# Patient Record
Sex: FEMALE | Race: WHITE | NOT HISPANIC OR LATINO
[De-identification: names, ages, dates, MRNs, and addresses within clinical notes are randomized per-mention and may not be internally consistent; named-entity substitution may affect disease eponyms.]

---

## 2018-11-13 ENCOUNTER — RECORD ABSTRACTING (OUTPATIENT)
Age: 73
End: 2018-11-13

## 2018-11-13 DIAGNOSIS — G89.29 OTHER CHRONIC PAIN: ICD-10-CM

## 2018-11-13 DIAGNOSIS — Z87.891 PERSONAL HISTORY OF NICOTINE DEPENDENCE: ICD-10-CM

## 2018-11-13 DIAGNOSIS — R27.8 OTHER LACK OF COORDINATION: ICD-10-CM

## 2018-11-13 DIAGNOSIS — Z86.19 PERSONAL HISTORY OF OTHER INFECTIOUS AND PARASITIC DISEASES: ICD-10-CM

## 2018-11-13 DIAGNOSIS — Z82.5 FAMILY HISTORY OF ASTHMA AND OTHER CHRONIC LOWER RESPIRATORY DISEASES: ICD-10-CM

## 2018-11-13 DIAGNOSIS — Z82.0 FAMILY HISTORY OF EPILEPSY AND OTHER DISEASES OF THE NERVOUS SYSTEM: ICD-10-CM

## 2018-11-13 DIAGNOSIS — E66.3 OVERWEIGHT: ICD-10-CM

## 2018-11-13 DIAGNOSIS — Z86.2 PERSONAL HISTORY OF DISEASES OF THE BLOOD AND BLOOD-FORMING ORGANS AND CERTAIN DISORDERS INVOLVING THE IMMUNE MECHANISM: ICD-10-CM

## 2018-11-13 LAB — CYTOLOGY CVX/VAG DOC THIN PREP: NORMAL

## 2018-12-04 ENCOUNTER — APPOINTMENT (OUTPATIENT)
Dept: OBGYN | Facility: CLINIC | Age: 73
End: 2018-12-04

## 2019-04-02 ENCOUNTER — APPOINTMENT (OUTPATIENT)
Dept: NEUROLOGY | Facility: CLINIC | Age: 74
End: 2019-04-02
Payer: MEDICARE

## 2019-04-02 VITALS
HEIGHT: 63 IN | SYSTOLIC BLOOD PRESSURE: 109 MMHG | WEIGHT: 175 LBS | TEMPERATURE: 99.5 F | HEART RATE: 78 BPM | BODY MASS INDEX: 31.01 KG/M2 | DIASTOLIC BLOOD PRESSURE: 75 MMHG

## 2019-04-02 PROCEDURE — 99205 OFFICE O/P NEW HI 60 MIN: CPT

## 2019-04-03 NOTE — CONSULT LETTER
[Dear  ___] : Dear  [unfilled], [FreeTextEntry1] : I had the pleasure of evaluating your patient ANDREAS CONTRERAS. Please see the assessment section for a summary of my diagnostic impression and plan.\par \par Thank you very much for allowing me to participate in the care of this patient. If you have any questions, please do not hesitate to contact me.\par \par Sincerely,\par \par Shereen Gómez MD\par

## 2019-04-03 NOTE — PHYSICAL EXAM
[___ / 30] : the patient achieved a total score of [unfilled] /30 [FreeTextEntry1] : Physical examination \par General: No acute distress, Awake, Alert.   \par Fundus: disc margins sharp.   \par Neck: no Carotid bruit.   \par Cardiovascular: Normal rate, Regular rhythm, No murmur.  \par \par Mental status \par Awake, alert, and oriented to person, time and place, Normal attention span and concentration, Recent and remote memory intact, Language intact, Fund of knowledge intact.   \par \par Cranial Nerves \par II: VFF  \par III, IV, VI: PERRL, EOMI.   Pupils 3--> 2, bilaterally.\par V: Facial sensation is normal B/L.   \par VII: Facial strength is normal B/L. \par VIII:   Decreased hearing bilaterally, worse on the right.\par IX, X: Palate is midline and elevates symmetrically.   \par XI: Trapezius normal strength.   \par XII: Tongue midline without atrophy or fasciculations. \par \par Motor exam  \par Muscle tone - no evidence of rigidity or resistance in all 4 extremities.  \par No atrophy or fasciculations \par Muscle Strength: arms and legs, proximal and distal flexors and extensors are normal \par No UE drift.\par \par Reflexes \par 1+ throughout\par \par Plantars right: mute.   \par Plantars left: mute.   \par \par \par Coordination \par Finger to nose: Normal.  \par Heel to shin: Normal.   \par \par \par Sensory \par Intact sensation to vibration and cold.\par \par \par \par Gait \par Normal including heels, toes, and tandem gait.  \par \par \par

## 2019-04-03 NOTE — REVIEW OF SYSTEMS
[Chest Pain] : chest pain [Negative] : Heme/Lymph [FreeTextEntry5] : chest pressure, leg swelling  [de-identified] : heat intolerance, cold intolerance

## 2019-04-03 NOTE — ASSESSMENT
[FreeTextEntry1] : Ms. Brice is a 75-year-old woman with multiple issues:\par Mild cognitive impairment possibly related to multiple concussions.  I referred the patient to Dr. Ky Haji, director of the [facility] program.\par Dizziness and imbalance with normal neurological examination.  She already has an appointment for ENT.  I recommend VNG, vestibular rehabilitation.\par

## 2019-11-21 ENCOUNTER — APPOINTMENT (OUTPATIENT)
Dept: GERIATRICS | Facility: CLINIC | Age: 74
End: 2019-11-21
Payer: MEDICARE

## 2019-11-21 VITALS
TEMPERATURE: 98.3 F | SYSTOLIC BLOOD PRESSURE: 120 MMHG | HEART RATE: 85 BPM | WEIGHT: 186 LBS | DIASTOLIC BLOOD PRESSURE: 60 MMHG | OXYGEN SATURATION: 97 % | BODY MASS INDEX: 32.95 KG/M2

## 2019-11-21 DIAGNOSIS — N60.19 DIFFUSE CYSTIC MASTOPATHY OF UNSPECIFIED BREAST: ICD-10-CM

## 2019-11-21 DIAGNOSIS — Z00.00 ENCOUNTER FOR GENERAL ADULT MEDICAL EXAMINATION W/OUT ABNORMAL FINDINGS: ICD-10-CM

## 2019-11-21 PROCEDURE — 93010 ELECTROCARDIOGRAM REPORT: CPT

## 2019-11-21 PROCEDURE — 99205 OFFICE O/P NEW HI 60 MIN: CPT | Mod: 25

## 2019-11-22 ENCOUNTER — NON-APPOINTMENT (OUTPATIENT)
Age: 74
End: 2019-11-22

## 2019-11-22 RX ORDER — MULTIVITAMIN
TABLET ORAL DAILY
Qty: 30 | Refills: 0 | Status: ACTIVE | COMMUNITY
Start: 2019-11-22

## 2019-11-22 RX ORDER — MULTIVITAMIN/IRON/FOLIC ACID 18MG-0.4MG
600-400 TABLET ORAL
Refills: 0 | Status: ACTIVE | COMMUNITY
Start: 2019-11-22

## 2019-11-22 NOTE — HISTORY OF PRESENT ILLNESS
[FreeTextEntry1] : Establish care, GYN referral, mammo script [de-identified] : 74 year old female with a history of FBD, numerous concussions / head injuries, HLD, HTN, DM, osteoporosis who presents today to establish care.  \par \par Patient with numerous head injuries / potential concussions throughout her life.  Now with significant memory loss, difficulty obtaining medical history.  \par \par DM: Patient currently on metformin 500 mg BID.  She does not follow a low carb, low sugar diet.  She does not check her BG.  LAst HgbA1c about one year ago, may have been 6.7%.  She does not routinely exercise due to chronic back pain.  Last eye exam one year ago, not sure of DRRonnie  Denies neuropathy.  \par \par HTN: Not on antihypertensives.  History of CP in the past, no recent episodes.  Consulted with cardiology 2017. She is complaining of palpitations intermittently.  She had a TTE 11/1/2017 which demonstrated NLVSF, EF 64%, grade I diastolic dysfunction, mild MV and AV regurg, AV sclerosis without stenosis.  NST 10/30/2017 normal study.  \par \par HLD: Long history of elevated lipids.  Started on atorvastatin in 2012 but stopped due to muscle cramps.  She was switched to pravastatin which she seems to tolerate however she is not compliant with taking her medications secondary to memory loss.   \par \par Ankle pain and swelling: Patient states she has a long standing history of swelling and pain to bilateral ankles.  Consulted for surgery but states no one wants to perform the procedure.  She is not sure of what is going on but states that the pain is so severe she has trouble walking. \par \par Patient is requesting an Rx for repeat mammo and referral for GYN.  She states she has a history of abnormal pap, had surgery but not sure of details.

## 2019-11-22 NOTE — HEALTH RISK ASSESSMENT
[6-10] : 6-10 [No] : In the past 12 months have you used drugs other than those required for medical reasons? No [Any fall with injury in past year] : Patient reported fall with injury in the past year [1] : 2) Feeling down, depressed, or hopeless for several days (1) [Change in mental status noted] : Change in mental status noted [Alone] : lives alone [Retired] : retired [] :  [# Of Children ___] : has [unfilled] children [Feels Safe at Home] : Feels safe at home [Fully functional (bathing, dressing, toileting, transferring, walking, feeding)] : Fully functional (bathing, dressing, toileting, transferring, walking, feeding) [Fully functional (using the telephone, shopping, preparing meals, housekeeping, doing laundry, using] : Fully functional and needs no help or supervision to perform IADLs (using the telephone, shopping, preparing meals, housekeeping, doing laundry, using transportation, managing medications and managing finances) [Reports changes in dental health] : Reports changes in dental health [Smoke Detector] : smoke detector [Carbon Monoxide Detector] : carbon monoxide detector [Seat Belt] :  uses seat belt [] : No [[id number]] : 2 [Sexually Active] : not sexually active [Reports changes in hearing] : Reports no changes in hearing [Reports changes in vision] : Reports no changes in vision [MammogramDate] : 1/15/2019 [MammogramComments] : BIRADS 2 [BoneDensityDate] : 1/12/2017 [BoneDensityComments] : Osteoporosis hip and spine [de-identified] : Memory loss [FreeTextEntry2] :  at [employer] for 20 years, retired 2008 [de-identified] : Peridental disease

## 2019-11-22 NOTE — ASSESSMENT
[FreeTextEntry1] : HLD: Need to repeat FLP.  Discussed using a pill box and setting an alarm on her phone to remember to take her medication.  Advised a low fat diet and increase physical activity as tolerated.  Continue with pravastatin 10 mg daily.  \par \par HTN: Not on medications, BP normal.  Advised a low salt diet, continue to monitor. \par \par Hyperparathyroidism: Check PTH.  Patient on vincent/vit D supplementation. \par \par Osteoporosis: Unable to use bisphosphonate due to GERD.  Not candidate for Prolia given periodontal disease.  Check vitamin D. \par \par Palpitations:  EKG SR.  If continues to experience, refer to cardiology. \par \par DM: Discussed importance of a low carb / sugar diet, increasing physical activity as tolerated and need to weight loss.  Continue with metformin 500 mg BID.  Check HgbA1c, Ma/Cr.\par \par Ankle pain: No edema but there is some soft tissue swelling.  Will obtain records to see if any imaging has been performed. \par \par History of concussion / memory loss: Patient referred to Dr. Haji in April, never completed.  Contact info once again provided.  \par \par HCM:\par Need to obtain medical records from previous PCP.  Some records were obtained from [facility] however do not know if these are the latest dates.  \par Pneumovax 12/16/2010 (age 65)\par Need Immunization record from PCP. \par Referral for mammo/US provided\par GYN referral provided, history of abnormal pap\par Colonoscopy: 12/12/2016 internal hemorrhoids\par \par Follow up in 3 months. 
No

## 2019-11-22 NOTE — REVIEW OF SYSTEMS
[Palpitations] : palpitations [Lower Ext Edema] : lower extremity edema [Joint Pain] : joint pain [Back Pain] : back pain [Memory Loss] : memory loss [Negative] : Heme/Lymph [de-identified] : Sleeps only 2-4 hours due to pain

## 2019-11-22 NOTE — PHYSICAL EXAM
[No Acute Distress] : no acute distress [Well-Appearing] : well-appearing [Normal Sclera/Conjunctiva] : normal sclera/conjunctiva [PERRL] : pupils equal round and reactive to light [EOMI] : extraocular movements intact [Normal Outer Ear/Nose] : the outer ears and nose were normal in appearance [Normal Oropharynx] : the oropharynx was normal [Normal TMs] : both tympanic membranes were normal [No JVD] : no jugular venous distention [No Lymphadenopathy] : no lymphadenopathy [Supple] : supple [Thyroid Normal, No Nodules] : the thyroid was normal and there were no nodules present [No Respiratory Distress] : no respiratory distress  [No Accessory Muscle Use] : no accessory muscle use [Clear to Auscultation] : lungs were clear to auscultation bilaterally [Normal Rate] : normal rate  [Regular Rhythm] : with a regular rhythm [Normal S1, S2] : normal S1 and S2 [No Murmur] : no murmur heard [No Carotid Bruits] : no carotid bruits [No Abdominal Bruit] : a ~M bruit was not heard ~T in the abdomen [No Varicosities] : no varicosities [Pedal Pulses Present] : the pedal pulses are present [No Edema] : there was no peripheral edema [No Palpable Aorta] : no palpable aorta [No Extremity Clubbing/Cyanosis] : no extremity clubbing/cyanosis [Soft] : abdomen soft [Non Tender] : non-tender [Non-distended] : non-distended [No HSM] : no HSM [Normal Bowel Sounds] : normal bowel sounds [Normal Supraclavicular Nodes] : no supraclavicular lymphadenopathy [Normal Posterior Cervical Nodes] : no posterior cervical lymphadenopathy [Normal Anterior Cervical Nodes] : no anterior cervical lymphadenopathy [No CVA Tenderness] : no CVA  tenderness [No Spinal Tenderness] : no spinal tenderness [No Joint Swelling] : no joint swelling [Grossly Normal Strength/Tone] : grossly normal strength/tone [No Rash] : no rash [Coordination Grossly Intact] : coordination grossly intact [No Focal Deficits] : no focal deficits [Normal Gait] : normal gait [Speech Grossly Normal] : speech grossly normal [Normal Affect] : the affect was normal [Normal Mood] : the mood was normal [Normal Appearance] : normal in appearance [No Masses] : no palpable masses [No Nipple Discharge] : no nipple discharge [de-identified] : Soft tissue mass bilaterally below the ankle [de-identified] : Romberg negative [de-identified] : Memory impairment

## 2020-01-09 ENCOUNTER — MEDICATION RENEWAL (OUTPATIENT)
Age: 75
End: 2020-01-09

## 2020-02-04 ENCOUNTER — RESULT REVIEW (OUTPATIENT)
Age: 75
End: 2020-02-04

## 2020-02-13 ENCOUNTER — APPOINTMENT (OUTPATIENT)
Dept: GERIATRICS | Facility: CLINIC | Age: 75
End: 2020-02-13

## 2020-02-26 ENCOUNTER — RESULT REVIEW (OUTPATIENT)
Age: 75
End: 2020-02-26

## 2020-02-26 ENCOUNTER — APPOINTMENT (OUTPATIENT)
Dept: GERIATRICS | Facility: CLINIC | Age: 75
End: 2020-02-26
Payer: MEDICARE

## 2020-02-26 VITALS
OXYGEN SATURATION: 96 % | SYSTOLIC BLOOD PRESSURE: 122 MMHG | TEMPERATURE: 98.5 F | BODY MASS INDEX: 32.77 KG/M2 | HEART RATE: 86 BPM | DIASTOLIC BLOOD PRESSURE: 60 MMHG | WEIGHT: 185 LBS

## 2020-02-26 DIAGNOSIS — M25.572 PAIN IN RIGHT ANKLE AND JOINTS OF RIGHT FOOT: ICD-10-CM

## 2020-02-26 DIAGNOSIS — G89.29 PAIN IN RIGHT ANKLE AND JOINTS OF RIGHT FOOT: ICD-10-CM

## 2020-02-26 DIAGNOSIS — Z87.820 PERSONAL HISTORY OF TRAUMATIC BRAIN INJURY: ICD-10-CM

## 2020-02-26 DIAGNOSIS — M25.571 PAIN IN RIGHT ANKLE AND JOINTS OF RIGHT FOOT: ICD-10-CM

## 2020-02-26 PROCEDURE — 99214 OFFICE O/P EST MOD 30 MIN: CPT

## 2020-02-26 NOTE — HISTORY OF PRESENT ILLNESS
[de-identified] : 74 year old female with a history of FBD, numerous concussions / head injuries, HLD, HTN, DM, osteoporosis who presents today for a follow up. \par \par DM: Patient currently on metformin 500 mg BID. Does not take consistent as she has issues with her memory.  She does not follow a low carb, low sugar diet.  She does not check her BG.  Last HgbA1c 5.9%.  She does not routinely exercise due to chronic back pain.  Last eye exam one year ago, not sure of DRRonnie  Denies neuropathy.  \par \par HLD: Long history of elevated lipids.  Started on atorvastatin in 2012 but stopped due to muscle cramps.  She was switched to pravastatin which she seems to tolerate however she is not compliant with taking her medications secondary to memory loss.   Last LP 2/2020 Tchol 221 ; HDL 69 ; TG 90 ; . \par \par Back pain / leg cramping: Continues to complain of back pain and having pain / cramping down her legs.  Worse with walking.  She denies any weakness but states that pain is so severe she has to "drag" her legs.  Last few months complaining of increased urinary frequency with urge incontinence, no bowel issues.  Consulted Dr. Campbell of [facility], states she was advised on fusion surgery.  Never followed up. \par \par Ankle pain and swelling: Patient states she has a long standing history of swelling and pain to bilateral ankles.  Continues to have significant pain which is worse when walking.  Not sure if she has consulted with ortho in the past.   [FreeTextEntry1] : Follow up

## 2020-02-26 NOTE — HEALTH RISK ASSESSMENT
[No] : In the past 12 months have you used drugs other than those required for medical reasons? No [Any fall with injury in past year] : Patient reported fall with injury in the past year [1] : 1) Little interest or pleasure doing things for several days (1) [[id number]] : 2 [] : No

## 2020-02-26 NOTE — PHYSICAL EXAM
[No Acute Distress] : no acute distress [Well-Appearing] : well-appearing [Normal Sclera/Conjunctiva] : normal sclera/conjunctiva [EOMI] : extraocular movements intact [PERRL] : pupils equal round and reactive to light [Normal Outer Ear/Nose] : the outer ears and nose were normal in appearance [Normal Oropharynx] : the oropharynx was normal [No Lymphadenopathy] : no lymphadenopathy [No JVD] : no jugular venous distention [Thyroid Normal, No Nodules] : the thyroid was normal and there were no nodules present [Supple] : supple [No Respiratory Distress] : no respiratory distress  [No Accessory Muscle Use] : no accessory muscle use [Clear to Auscultation] : lungs were clear to auscultation bilaterally [Normal Rate] : normal rate  [Regular Rhythm] : with a regular rhythm [No Carotid Bruits] : no carotid bruits [Normal S1, S2] : normal S1 and S2 [No Murmur] : no murmur heard [No Varicosities] : no varicosities [No Abdominal Bruit] : a ~M bruit was not heard ~T in the abdomen [No Edema] : there was no peripheral edema [No Palpable Aorta] : no palpable aorta [Pedal Pulses Present] : the pedal pulses are present [Normal Appearance] : normal in appearance [No Extremity Clubbing/Cyanosis] : no extremity clubbing/cyanosis [No Nipple Discharge] : no nipple discharge [Soft] : abdomen soft [Non-distended] : non-distended [Non Tender] : non-tender [No Masses] : no abdominal mass palpated [No HSM] : no HSM [Normal Bowel Sounds] : normal bowel sounds [Normal Supraclavicular Nodes] : no supraclavicular lymphadenopathy [Normal Anterior Cervical Nodes] : no anterior cervical lymphadenopathy [Normal Posterior Cervical Nodes] : no posterior cervical lymphadenopathy [No CVA Tenderness] : no CVA  tenderness [No Spinal Tenderness] : no spinal tenderness [No Joint Swelling] : no joint swelling [Grossly Normal Strength/Tone] : grossly normal strength/tone [Coordination Grossly Intact] : coordination grossly intact [No Rash] : no rash [No Focal Deficits] : no focal deficits [Normal Gait] : normal gait [Speech Grossly Normal] : speech grossly normal [Normal Affect] : the affect was normal [Normal Mood] : the mood was normal [de-identified] : L canal excessive cerumen, right TM normal [de-identified] : Romberg negative [de-identified] : Soft tissue mass bilaterally below the ankle [de-identified] : Memory impairment

## 2020-02-26 NOTE — ASSESSMENT
[FreeTextEntry1] : HLD:  Discussed using a pill box and setting an alarm on her phone to remember to take her medication.  Advised a low fat diet and increase physical activity as tolerated.  Continue with pravastatin 10 mg daily.  \par \par Hyperparathyroidism: Last PTH normal at 35.7.  Patient on vincent/vit D supplementation. \par \par Osteoporosis: Unable to use bisphosphonate due to GERD.  Not candidate for Prolia given periodontal disease.  Vitamin D level 43.6.  Referral to endocrinology for discussion of treatment options. Has appt for repeat DEXA next week.  \par \par DM: Discussed importance of a low carb / sugar diet, increasing physical activity as tolerated and need for weight loss.  Continue with metformin 500 mg BID.  \par Last HgbA1c 5.9%\par Urine microalbumin 2/6/2020 8 mg/g.\par \par Urinary frequency: Check UA/UCx.\par \par Leg cramps: Will check Mg level, last K normal, patient slightly dehydrated.  Advised to increase water intake.  May also be neurogenic claudication, advised to follow up with Dr. Campbell.  \par \par Ankle pain: No edema but there is some soft tissue swelling.  Referral to [facility] for further evaluation.  \par \par History of concussion / memory loss: Patient has not scheduled consult with Dr. Haji.  Contact info once again provided.  \par \par HCM:\par Need to obtain medical records from previous PCP.  Some records were obtained from [facility] however do not know if these are the latest dates.  Release form was completed and sent to provider in Nov, no records received to date.  Will have  follow up.  \par Pneumovax 12/16/2010 (age 65)\par Need Immunization record from PCP. \par Mammogram: 2/4/2020, BIRADS 2\par GYN referral provided, history of abnormal pap.  Advised to schedule. \par Colonoscopy: 12/12/2016 internal hemorrhoids\par \par \par Follow up in 3 months.

## 2020-02-26 NOTE — REVIEW OF SYSTEMS
[Lower Ext Edema] : lower extremity edema [Joint Pain] : joint pain [Back Pain] : back pain [Memory Loss] : memory loss [Negative] : Heme/Lymph [de-identified] : Sleeps only 2-4 hours due to pain

## 2020-06-19 ENCOUNTER — RX RENEWAL (OUTPATIENT)
Age: 75
End: 2020-06-19

## 2021-01-18 ENCOUNTER — RX RENEWAL (OUTPATIENT)
Age: 76
End: 2021-01-18

## 2021-07-15 ENCOUNTER — APPOINTMENT (OUTPATIENT)
Dept: GERIATRICS | Facility: CLINIC | Age: 76
End: 2021-07-15
Payer: MEDICARE

## 2021-07-15 VITALS
TEMPERATURE: 97.7 F | HEART RATE: 77 BPM | OXYGEN SATURATION: 99 % | HEIGHT: 63 IN | DIASTOLIC BLOOD PRESSURE: 60 MMHG | SYSTOLIC BLOOD PRESSURE: 110 MMHG

## 2021-07-15 PROCEDURE — 93000 ELECTROCARDIOGRAM COMPLETE: CPT

## 2021-07-15 PROCEDURE — G0439: CPT

## 2021-07-15 PROCEDURE — 36415 COLL VENOUS BLD VENIPUNCTURE: CPT

## 2021-07-16 ENCOUNTER — NON-APPOINTMENT (OUTPATIENT)
Age: 76
End: 2021-07-16

## 2021-07-16 NOTE — HEALTH RISK ASSESSMENT
[No] : In the past 12 months have you used drugs other than those required for medical reasons? No [Any fall with injury in past year] : Patient reported fall with injury in the past year [0] : 2) Feeling down, depressed, or hopeless: Not at all (0) [PHQ-2 Negative - No further assessment needed] : PHQ-2 Negative - No further assessment needed [] : No [[id number]] : 0 [Language] : denies difficulty with language [Behavior] : denies difficulty with behavior [Learning/Retaining New Information] : difficulty learning/retaining new information [Handling Complex Tasks] : denies difficulty handling complex tasks [Reasoning] : denies difficulty with reasoning [Spatial Ability and Orientation] : denies difficulty with spatial ability and orientation [Alone] : lives alone [Retired] : retired [] :  [# Of Children ___] : has [unfilled] children [Sexually Active] : not sexually active [Feels Safe at Home] : Feels safe at home [Fully functional (bathing, dressing, toileting, transferring, walking, feeding)] : Fully functional (bathing, dressing, toileting, transferring, walking, feeding) [Fully functional (using the telephone, shopping, preparing meals, housekeeping, doing laundry, using] : Fully functional and needs no help or supervision to perform IADLs (using the telephone, shopping, preparing meals, housekeeping, doing laundry, using transportation, managing medications and managing finances) [Reports changes in hearing] : Reports no changes in hearing [Reports changes in vision] : Reports no changes in vision [Smoke Detector] : smoke detector [Carbon Monoxide Detector] : carbon monoxide detector [Seat Belt] :  uses seat belt [MammogramDate] : 2/24/2020 [MammogramComments] : BIRADS 2 [BoneDensityDate] : 1/12/2017 [BoneDensityComments] : Osteoporosis hip and spine [ColonoscopyDate] : 12/12/2016 [ColonoscopyComments] : Internal hemorrhoids [de-identified] : Memory loss [FreeTextEntry2] :  at [employer] for 20 years, retired 2008

## 2021-07-16 NOTE — HISTORY OF PRESENT ILLNESS
[FreeTextEntry1] : Annual, GYN referral, mammo script, Dexa script [de-identified] : 74 year old female with a history of FBD, numerous concussions / head injuries, HLD, HTN, DM, osteoporosis who presents today for a follow up. \par \par Patient admits to medication non-compliance for over a year.  Taking the statin maybe twice a week, metformin more seldom. \par \par Fell last week in her building.  Fell face down, hit left ribs on metal door saddle.  Did not get evaluated.  Has left rib pain.  Using a back brace to help support the area.  Did not strike her face or head. \par \par DM: Metformin 500 mg BID prescribed but not compliant.  Has not been eating well, a lot of snack and junk foods.  She does not check her BG.  Last HgbA1c 5.9% from 2/2020.  She does not routinely exercise due to chronic back pain.  Last eye exam one year ago, not sure of DR. Fam neuropathy.  \par \par HLD: Long history of elevated lipids.  Started on atorvastatin in 2012 but stopped due to muscle cramps.  She was switched to pravastatin which she seems to tolerate however she is not compliant with taking her medications.  Taking 20 mg maybe twice a week.   Last LP 2/2020 Tchol 221 ; HDL 69 ; TG 90 ; . \par \par Osteoporosis: Never treated.  Would like repeat DEXA.

## 2021-07-16 NOTE — CURRENT MEDS
[Takes medication as prescribed] : does not take [Other ___] : [unfilled] [Yes] : Reviewed medication list for presence of high-risk medications.

## 2021-07-16 NOTE — REVIEW OF SYSTEMS
[Joint Pain] : joint pain [Back Pain] : back pain [Memory Loss] : memory loss [Negative] : Heme/Lymph [FreeTextEntry9] : Left rib pain [de-identified] : Sleeps only 2-4 hours due to pain

## 2021-07-16 NOTE — PHYSICAL EXAM
[No Acute Distress] : no acute distress [Well-Appearing] : well-appearing [Normal Sclera/Conjunctiva] : normal sclera/conjunctiva [PERRL] : pupils equal round and reactive to light [EOMI] : extraocular movements intact [Normal Outer Ear/Nose] : the outer ears and nose were normal in appearance [Normal Oropharynx] : the oropharynx was normal [Normal TMs] : both tympanic membranes were normal [No JVD] : no jugular venous distention [No Lymphadenopathy] : no lymphadenopathy [Supple] : supple [Thyroid Normal, No Nodules] : the thyroid was normal and there were no nodules present [No Respiratory Distress] : no respiratory distress  [No Accessory Muscle Use] : no accessory muscle use [Clear to Auscultation] : lungs were clear to auscultation bilaterally [Normal Rate] : normal rate  [Regular Rhythm] : with a regular rhythm [Normal S1, S2] : normal S1 and S2 [No Murmur] : no murmur heard [No Carotid Bruits] : no carotid bruits [No Abdominal Bruit] : a ~M bruit was not heard ~T in the abdomen [No Varicosities] : no varicosities [Pedal Pulses Present] : the pedal pulses are present [No Edema] : there was no peripheral edema [Normal Appearance] : normal in appearance [No Nipple Discharge] : no nipple discharge [Soft] : abdomen soft [Non Tender] : non-tender [Non-distended] : non-distended [No Masses] : no abdominal mass palpated [No HSM] : no HSM [Normal Bowel Sounds] : normal bowel sounds [Normal Supraclavicular Nodes] : no supraclavicular lymphadenopathy [Normal Posterior Cervical Nodes] : no posterior cervical lymphadenopathy [Normal Anterior Cervical Nodes] : no anterior cervical lymphadenopathy [No CVA Tenderness] : no CVA  tenderness [No Spinal Tenderness] : no spinal tenderness [No Joint Swelling] : no joint swelling [Grossly Normal Strength/Tone] : grossly normal strength/tone [No Rash] : no rash [Coordination Grossly Intact] : coordination grossly intact [No Focal Deficits] : no focal deficits [Normal Gait] : normal gait [Speech Grossly Normal] : speech grossly normal [Normal Affect] : the affect was normal [Normal Mood] : the mood was normal [de-identified] : Left rib tenderness [de-identified] : Romberg negative [de-identified] : Memory impairment

## 2021-07-19 ENCOUNTER — RESULT REVIEW (OUTPATIENT)
Age: 76
End: 2021-07-19

## 2021-07-19 LAB
25(OH)D3 SERPL-MCNC: 65.8 NG/ML
ALBUMIN SERPL ELPH-MCNC: 4.6 G/DL
ALP BLD-CCNC: 103 U/L
ALT SERPL-CCNC: 14 U/L
ANION GAP SERPL CALC-SCNC: 13 MMOL/L
APPEARANCE: CLEAR
AST SERPL-CCNC: 24 U/L
BACTERIA: NEGATIVE
BASOPHILS # BLD AUTO: 0.06 K/UL
BASOPHILS NFR BLD AUTO: 1 %
BILIRUB SERPL-MCNC: 1 MG/DL
BILIRUBIN URINE: NEGATIVE
BLOOD URINE: NEGATIVE
BUN SERPL-MCNC: 23 MG/DL
CALCIUM SERPL-MCNC: 10 MG/DL
CALCIUM SERPL-MCNC: 10 MG/DL
CHLORIDE SERPL-SCNC: 102 MMOL/L
CHOLEST SERPL-MCNC: 229 MG/DL
CO2 SERPL-SCNC: 24 MMOL/L
COLOR: NORMAL
CREAT SERPL-MCNC: 1.16 MG/DL
CREAT SPEC-SCNC: 109 MG/DL
EOSINOPHIL # BLD AUTO: 0.12 K/UL
EOSINOPHIL NFR BLD AUTO: 1.9 %
ESTIMATED AVERAGE GLUCOSE: 117 MG/DL
FOLATE SERPL-MCNC: 14.7 NG/ML
GLUCOSE QUALITATIVE U: NEGATIVE
GLUCOSE SERPL-MCNC: 82 MG/DL
HBA1C MFR BLD HPLC: 5.7 %
HCT VFR BLD CALC: 39.8 %
HDLC SERPL-MCNC: 74 MG/DL
HGB BLD-MCNC: 12.6 G/DL
HYALINE CASTS: 0 /LPF
IMM GRANULOCYTES NFR BLD AUTO: 0.2 %
KETONES URINE: NEGATIVE
LDLC SERPL CALC-MCNC: 140 MG/DL
LEUKOCYTE ESTERASE URINE: NEGATIVE
LYMPHOCYTES # BLD AUTO: 1.86 K/UL
LYMPHOCYTES NFR BLD AUTO: 30.1 %
MAN DIFF?: NORMAL
MCHC RBC-ENTMCNC: 29.3 PG
MCHC RBC-ENTMCNC: 31.7 GM/DL
MCV RBC AUTO: 92.6 FL
MICROALBUMIN 24H UR DL<=1MG/L-MCNC: <1.2 MG/DL
MICROALBUMIN/CREAT 24H UR-RTO: NORMAL MG/G
MICROSCOPIC-UA: NORMAL
MONOCYTES # BLD AUTO: 0.73 K/UL
MONOCYTES NFR BLD AUTO: 11.8 %
NEUTROPHILS # BLD AUTO: 3.39 K/UL
NEUTROPHILS NFR BLD AUTO: 55 %
NITRITE URINE: NEGATIVE
NONHDLC SERPL-MCNC: 155 MG/DL
PARATHYROID HORMONE INTACT: 22 PG/ML
PH URINE: 5.5
PLATELET # BLD AUTO: 339 K/UL
POTASSIUM SERPL-SCNC: 4.3 MMOL/L
PROT SERPL-MCNC: 7.4 G/DL
PROTEIN URINE: NEGATIVE
RBC # BLD: 4.3 M/UL
RBC # FLD: 14 %
RED BLOOD CELLS URINE: 1 /HPF
SODIUM SERPL-SCNC: 138 MMOL/L
SPECIFIC GRAVITY URINE: 1.01
SQUAMOUS EPITHELIAL CELLS: 1 /HPF
TRIGL SERPL-MCNC: 77 MG/DL
TSH SERPL-ACNC: 0.94 UIU/ML
UROBILINOGEN URINE: NORMAL
VIT B12 SERPL-MCNC: 834 PG/ML
WBC # FLD AUTO: 6.17 K/UL
WHITE BLOOD CELLS URINE: 2 /HPF

## 2021-07-23 DIAGNOSIS — Z23 ENCOUNTER FOR IMMUNIZATION: ICD-10-CM

## 2021-08-19 ENCOUNTER — RESULT REVIEW (OUTPATIENT)
Age: 76
End: 2021-08-19

## 2021-08-27 ENCOUNTER — APPOINTMENT (OUTPATIENT)
Dept: GERIATRICS | Facility: CLINIC | Age: 76
End: 2021-08-27
Payer: MEDICARE

## 2021-08-27 ENCOUNTER — NON-APPOINTMENT (OUTPATIENT)
Age: 76
End: 2021-08-27

## 2021-08-27 VITALS
HEIGHT: 63 IN | OXYGEN SATURATION: 97 % | SYSTOLIC BLOOD PRESSURE: 92 MMHG | WEIGHT: 164 LBS | DIASTOLIC BLOOD PRESSURE: 60 MMHG | BODY MASS INDEX: 29.06 KG/M2 | TEMPERATURE: 98.4 F | HEART RATE: 61 BPM

## 2021-08-27 PROCEDURE — 99213 OFFICE O/P EST LOW 20 MIN: CPT | Mod: 25

## 2021-08-27 PROCEDURE — 93000 ELECTROCARDIOGRAM COMPLETE: CPT

## 2021-08-27 NOTE — PHYSICAL EXAM
[No Acute Distress] : no acute distress [No Respiratory Distress] : no respiratory distress  [Clear to Auscultation] : lungs were clear to auscultation bilaterally [Normal Rate] : normal rate  [Regular Rhythm] : with a regular rhythm [Normal S1, S2] : normal S1 and S2 [No Edema] : there was no peripheral edema [Alert and Oriented x3] : oriented to person, place, and time

## 2021-08-27 NOTE — HEALTH RISK ASSESSMENT
[Any fall with injury in past year] : Patient reported fall with injury in the past year [0] : 2) Feeling down, depressed, or hopeless: Not at all (0) [PHQ-2 Negative - No further assessment needed] : PHQ-2 Negative - No further assessment needed [[id number]] : 0

## 2021-08-27 NOTE — REVIEW OF SYSTEMS
[Chest Pain] : chest pain [Palpitations] : no palpitations [Lower Ext Edema] : no lower extremity edema [Dyspnea on Exertion] : no dyspnea on exertion [Negative] : Respiratory

## 2021-08-27 NOTE — HISTORY OF PRESENT ILLNESS
[FreeTextEntry1] : Chest pain [de-identified] : 76 year old female with a history of FBD, numerous concussions / head injuries, HLD, HTN, DM, osteoporosis who presents today for a follow up. \par \par Seem about 1 month ago.  She had fallen a few day prior to visit, face first, hit chest on floor over door saddle.  She had left sided chest discomfort at that time.  EKG negative for acute changes.  She was sent for a CXR which was negative for rib fracture.  She states the pain ended up going away a few days later but since then has returned.  It is located on the left side of her chest, by the breast.  Mammo 8/19/21 no new suspicious masses, malignant appearing microcalcifications or secondary signs of carcinoma.  She denies any associated symptoms.  States pain was constant.  She mentions being told by a provider long ago in her 20s that she had MVP.

## 2022-01-12 ENCOUNTER — NON-APPOINTMENT (OUTPATIENT)
Age: 77
End: 2022-01-12

## 2022-04-30 ENCOUNTER — NON-APPOINTMENT (OUTPATIENT)
Age: 77
End: 2022-04-30

## 2022-05-24 ENCOUNTER — APPOINTMENT (OUTPATIENT)
Dept: OBGYN | Facility: CLINIC | Age: 77
End: 2022-05-24

## 2022-07-18 ENCOUNTER — APPOINTMENT (OUTPATIENT)
Dept: GERIATRICS | Facility: CLINIC | Age: 77
End: 2022-07-18

## 2022-08-17 NOTE — ASSESSMENT
Detail Level: Detailed [FreeTextEntry1] : Rib pain: Patient wanting to know if there is a fracture. Send for XR.  \par \par HLD:  Discussed using a pill box and setting an alarm on her phone to remember to take her medication.  Advised a low fat diet and increase physical activity as tolerated.  Continue with pravastatin 10 mg daily. May need to change as patient states this is no longer covered by insurance.  Check LP.\par \par Hyperparathyroidism: Last PTH normal at 35.7.  Not consistent with supplementation.  Check CMP, PTH, Vit D.\par \par Osteoporosis: Unable to use bisphosphonate due to GERD.  Not candidate for Prolia given periodontal disease.   Referred to endocrinology for discussion of treatment options but did not follow through.  Never repeated DEXA, new referral provided. \par \par DM: Discussed importance of a low carb / sugar diet, increasing physical activity as tolerated and need for weight loss.  If no change in A1c from previous, can consider decreasing dosage to once daily. \par Last HgbA1c 5.9%, check today.\par Urine microalbumin 2/6/2020 8 mg/g, check today.\par \par Memory loss: MOCA with neuro 4/2019 26/30.  Schedule appointment to repeat. \par \par HCM:\par Pneumovax 3/31/2011 (age 66).  Pt states she received pna shot at [facility] recently, will obtain record to verify. \par PCV 03/31/2015\par Covid: States received 2 but did not bring in card with dates.\par \par GYN referral provided, history of abnormal pap.  Advised to schedule. \par \par \par \par Follow up in 6 months.
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
## 2022-11-23 ENCOUNTER — APPOINTMENT (OUTPATIENT)
Dept: GERIATRICS | Facility: CLINIC | Age: 77
End: 2022-11-23

## 2022-11-23 VITALS
WEIGHT: 128 LBS | HEIGHT: 63 IN | SYSTOLIC BLOOD PRESSURE: 108 MMHG | TEMPERATURE: 97.8 F | DIASTOLIC BLOOD PRESSURE: 64 MMHG | OXYGEN SATURATION: 97 % | BODY MASS INDEX: 22.68 KG/M2 | HEART RATE: 75 BPM

## 2022-11-23 DIAGNOSIS — R29.6 REPEATED FALLS: ICD-10-CM

## 2022-11-23 PROCEDURE — 36415 COLL VENOUS BLD VENIPUNCTURE: CPT

## 2022-11-23 PROCEDURE — G0439: CPT

## 2022-11-23 RX ORDER — ACETAMINOPHEN 500 MG/1
500 TABLET, COATED ORAL
Refills: 0 | Status: COMPLETED | COMMUNITY
End: 2022-11-23

## 2022-11-23 RX ORDER — CLINDAMYCIN HYDROCHLORIDE 300 MG/1
300 CAPSULE ORAL
Qty: 20 | Refills: 0 | Status: COMPLETED | COMMUNITY
Start: 2022-08-17

## 2022-11-23 RX ORDER — BIOTIN 5 MG
5 CAPSULE ORAL
Qty: 90 | Refills: 1 | Status: COMPLETED | COMMUNITY
Start: 2019-11-22 | End: 2022-11-23

## 2022-11-23 RX ORDER — TOBRAMYCIN AND DEXAMETHASONE 3; 1 MG/ML; MG/ML
0.3-0.1 SUSPENSION/ DROPS OPHTHALMIC
Qty: 5 | Refills: 0 | Status: COMPLETED | COMMUNITY
Start: 2022-07-26

## 2022-11-23 RX ORDER — IBUPROFEN 800 MG/1
800 TABLET, FILM COATED ORAL
Qty: 15 | Refills: 0 | Status: COMPLETED | COMMUNITY
Start: 2022-08-17

## 2022-11-23 RX ORDER — OXYCODONE AND ACETAMINOPHEN 5; 325 MG/1; MG/1
5-325 TABLET ORAL
Qty: 12 | Refills: 0 | Status: COMPLETED | COMMUNITY
Start: 2022-07-28

## 2022-11-23 NOTE — HISTORY OF PRESENT ILLNESS
[FreeTextEntry1] : Annual [de-identified] : 77 year old female with a history of FBD, numerous concussions / head injuries, HLD, HTN, DM, osteoporosis who presents today for a wellness visit. \par \par Continues to have pain in posterior scalp since Mohs surgery years ago.  Saw dermatologist about 6-8 months ago, no issue in scalp noted. \par \par Continues to have frequent falls.  Last fall 2 weeks ago, hit forehead and right clavicle.  Did not get medical attention.  Never followed up with neurology. \par \par Sleeping 2-3 hours at night, 530 am falls asleep in chair for ~ 30 mins.  Bought an OTC sleep aid but has not tried it yet, cannot recall name.\par \par 3 months ago, numerous bottom teeth removed.   Can't use temp dentures yet, having pain. Lost weight due to decrease oral intake. \par Eating soups, cut pasta, egg /  tuna fish salad. Forcing herself, food doesn’t taste good.  Weight was down to 119.\par \par Urine frequency, urge incontinence.

## 2022-11-23 NOTE — PHYSICAL EXAM
[No Acute Distress] : no acute distress [Normal Sclera/Conjunctiva] : normal sclera/conjunctiva [EOMI] : extraocular movements intact [Normal Outer Ear/Nose] : the outer ears and nose were normal in appearance [Normal Oropharynx] : the oropharynx was normal [Normal TMs] : both tympanic membranes were normal [No Lymphadenopathy] : no lymphadenopathy [Supple] : supple [No Respiratory Distress] : no respiratory distress  [No Accessory Muscle Use] : no accessory muscle use [Clear to Auscultation] : lungs were clear to auscultation bilaterally [Normal Rate] : normal rate  [Regular Rhythm] : with a regular rhythm [Normal S1, S2] : normal S1 and S2 [No Carotid Bruits] : no carotid bruits [No Abdominal Bruit] : a ~M bruit was not heard ~T in the abdomen [No Edema] : there was no peripheral edema [Soft] : abdomen soft [Non Tender] : non-tender [Non-distended] : non-distended [Normal Bowel Sounds] : normal bowel sounds [Grossly Normal Strength/Tone] : grossly normal strength/tone [No Focal Deficits] : no focal deficits [Speech Grossly Normal] : speech grossly normal [Alert and Oriented x3] : oriented to person, place, and time [de-identified] : Edentulous lower  [de-identified] : James

## 2022-11-23 NOTE — HEALTH RISK ASSESSMENT
[Any fall with injury in past year] : Patient reported fall with injury in the past year [0] : 1) Little interest or pleasure doing things: Not at all (0) [1] : 2) Feeling down, depressed, or hopeless for several days (1) [PHQ-2 Negative - No further assessment needed] : PHQ-2 Negative - No further assessment needed [Former] : Former [No] : In the past 12 months have you used drugs other than those required for medical reasons? No [[id number]] : 1 [Language] : denies difficulty with language [Behavior] : denies difficulty with behavior [Learning/Retaining New Information] : difficulty learning/retaining new information [Handling Complex Tasks] : denies difficulty handling complex tasks [Reasoning] : denies difficulty with reasoning [Spatial Ability and Orientation] : denies difficulty with spatial ability and orientation [None] : None [Alone] : lives alone [Retired] : retired [] :  [# Of Children ___] : has [unfilled] children [Feels Safe at Home] : Feels safe at home [Fully functional (bathing, dressing, toileting, transferring, walking, feeding)] : Fully functional (bathing, dressing, toileting, transferring, walking, feeding) [Fully functional (using the telephone, shopping, preparing meals, housekeeping, doing laundry, using] : Fully functional and needs no help or supervision to perform IADLs (using the telephone, shopping, preparing meals, housekeeping, doing laundry, using transportation, managing medications and managing finances) [Reports changes in hearing] : Reports no changes in hearing [Reports changes in vision] : Reports no changes in vision [Reports changes in dental health] : Reports changes in dental health [Smoke Detector] : smoke detector [Carbon Monoxide Detector] : carbon monoxide detector [Seat Belt] :  uses seat belt [MammogramDate] : 8/19/21 [MammogramComments] : BIRADS 2 [BoneDensityDate] : 8/19/21 [BoneDensityComments] : Osteoporosis [ColonoscopyDate] : 12/12/2016 [ColonoscopyComments] : Internal hemorrhoids [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

## 2022-11-23 NOTE — REVIEW OF SYSTEMS
[Fatigue] : fatigue [Chest Pain] : no chest pain [Palpitations] : no palpitations [Lower Ext Edema] : no lower extremity edema [Dyspnea on Exertion] : no dyspnea on exertion [Joint Pain] : joint pain [Back Pain] : back pain [Negative] : Heme/Lymph

## 2022-11-23 NOTE — ASSESSMENT
[FreeTextEntry1] : Check full labs. \par \par Referred for mammo.  \par \par Needs to follow up with neurology. \par \par UTD with flu and covid. \par \par Handicap permit completed.

## 2022-12-08 LAB
25(OH)D3 SERPL-MCNC: 88.8 NG/ML
ALBUMIN SERPL ELPH-MCNC: 4.1 G/DL
ALP BLD-CCNC: 56 U/L
ALT SERPL-CCNC: 9 U/L
ANION GAP SERPL CALC-SCNC: 14 MMOL/L
APPEARANCE: CLEAR
AST SERPL-CCNC: 20 U/L
BASOPHILS # BLD AUTO: 0.07 K/UL
BASOPHILS NFR BLD AUTO: 1.4 %
BILIRUB SERPL-MCNC: 0.4 MG/DL
BILIRUBIN URINE: NEGATIVE
BLOOD URINE: NEGATIVE
BUN SERPL-MCNC: 25 MG/DL
CALCIUM SERPL-MCNC: 9.4 MG/DL
CHLORIDE SERPL-SCNC: 103 MMOL/L
CHOLEST SERPL-MCNC: 258 MG/DL
CO2 SERPL-SCNC: 22 MMOL/L
COLOR: NORMAL
CREAT SERPL-MCNC: 1.01 MG/DL
CREAT SPEC-SCNC: 40 MG/DL
EGFR: 58 ML/MIN/1.73M2
EOSINOPHIL # BLD AUTO: 0.13 K/UL
EOSINOPHIL NFR BLD AUTO: 2.7 %
GLUCOSE QUALITATIVE U: NEGATIVE
GLUCOSE SERPL-MCNC: 99 MG/DL
HCT VFR BLD CALC: 36 %
HDLC SERPL-MCNC: 81 MG/DL
HGB BLD-MCNC: 11.3 G/DL
IMM GRANULOCYTES NFR BLD AUTO: 0.2 %
KETONES URINE: NEGATIVE
LDLC SERPL CALC-MCNC: 159 MG/DL
LEUKOCYTE ESTERASE URINE: NEGATIVE
LYMPHOCYTES # BLD AUTO: 1.46 K/UL
LYMPHOCYTES NFR BLD AUTO: 29.8 %
MAGNESIUM SERPL-MCNC: 2.1 MG/DL
MAN DIFF?: NORMAL
MCHC RBC-ENTMCNC: 29.4 PG
MCHC RBC-ENTMCNC: 31.4 GM/DL
MCV RBC AUTO: 93.8 FL
MICROALBUMIN 24H UR DL<=1MG/L-MCNC: <1.2 MG/DL
MICROALBUMIN/CREAT 24H UR-RTO: NORMAL MG/G
MONOCYTES # BLD AUTO: 0.47 K/UL
MONOCYTES NFR BLD AUTO: 9.6 %
NEUTROPHILS # BLD AUTO: 2.76 K/UL
NEUTROPHILS NFR BLD AUTO: 56.3 %
NITRITE URINE: NEGATIVE
NONHDLC SERPL-MCNC: 177 MG/DL
PH URINE: 6.5
PLATELET # BLD AUTO: 325 K/UL
POTASSIUM SERPL-SCNC: 4.7 MMOL/L
PROT SERPL-MCNC: 6.3 G/DL
PROTEIN URINE: NEGATIVE
RBC # BLD: 3.84 M/UL
RBC # FLD: 13.6 %
SODIUM SERPL-SCNC: 139 MMOL/L
SPECIFIC GRAVITY URINE: 1.01
TRIGL SERPL-MCNC: 87 MG/DL
TSH SERPL-ACNC: 0.89 UIU/ML
UROBILINOGEN URINE: NORMAL
VIT B12 SERPL-MCNC: 603 PG/ML
WBC # FLD AUTO: 4.9 K/UL

## 2023-02-11 ENCOUNTER — RESULT REVIEW (OUTPATIENT)
Age: 78
End: 2023-02-11

## 2023-03-07 ENCOUNTER — RESULT REVIEW (OUTPATIENT)
Age: 78
End: 2023-03-07

## 2023-06-28 ENCOUNTER — APPOINTMENT (OUTPATIENT)
Dept: CARDIOLOGY | Facility: CLINIC | Age: 78
End: 2023-06-28

## 2023-10-17 ENCOUNTER — RX RENEWAL (OUTPATIENT)
Age: 78
End: 2023-10-17

## 2023-10-17 RX ORDER — METFORMIN HYDROCHLORIDE 500 MG/1
500 TABLET, COATED ORAL
Qty: 180 | Refills: 3 | Status: ACTIVE | COMMUNITY
Start: 2019-11-22 | End: 1900-01-01

## 2024-03-13 ENCOUNTER — APPOINTMENT (OUTPATIENT)
Dept: GERIATRICS | Facility: CLINIC | Age: 79
End: 2024-03-13

## 2024-06-21 ENCOUNTER — APPOINTMENT (OUTPATIENT)
Dept: GERIATRICS | Facility: CLINIC | Age: 79
End: 2024-06-21
Payer: MEDICARE

## 2024-06-21 VITALS
SYSTOLIC BLOOD PRESSURE: 120 MMHG | TEMPERATURE: 97.6 F | BODY MASS INDEX: 22.5 KG/M2 | WEIGHT: 127 LBS | OXYGEN SATURATION: 98 % | HEIGHT: 63 IN | HEART RATE: 72 BPM | DIASTOLIC BLOOD PRESSURE: 82 MMHG

## 2024-06-21 DIAGNOSIS — I10 ESSENTIAL (PRIMARY) HYPERTENSION: ICD-10-CM

## 2024-06-21 DIAGNOSIS — R25.2 CRAMP AND SPASM: ICD-10-CM

## 2024-06-21 DIAGNOSIS — Z87.898 PERSONAL HISTORY OF OTHER SPECIFIED CONDITIONS: ICD-10-CM

## 2024-06-21 DIAGNOSIS — R41.3 OTHER AMNESIA: ICD-10-CM

## 2024-06-21 DIAGNOSIS — Z12.31 ENCOUNTER FOR SCREENING MAMMOGRAM FOR MALIGNANT NEOPLASM OF BREAST: ICD-10-CM

## 2024-06-21 DIAGNOSIS — E55.9 VITAMIN D DEFICIENCY, UNSPECIFIED: ICD-10-CM

## 2024-06-21 DIAGNOSIS — E11.8 TYPE 2 DIABETES MELLITUS WITH UNSPECIFIED COMPLICATIONS: ICD-10-CM

## 2024-06-21 DIAGNOSIS — M81.0 AGE-RELATED OSTEOPOROSIS W/OUT CURRENT PATHOLOGICAL FRACTURE: ICD-10-CM

## 2024-06-21 DIAGNOSIS — E78.5 HYPERLIPIDEMIA, UNSPECIFIED: ICD-10-CM

## 2024-06-21 DIAGNOSIS — R07.81 PLEURODYNIA: ICD-10-CM

## 2024-06-21 DIAGNOSIS — M79.674 PAIN IN RIGHT TOE(S): ICD-10-CM

## 2024-06-21 DIAGNOSIS — E21.3 HYPERPARATHYROIDISM, UNSPECIFIED: ICD-10-CM

## 2024-06-21 PROCEDURE — G0439: CPT

## 2024-06-21 PROCEDURE — 36415 COLL VENOUS BLD VENIPUNCTURE: CPT

## 2024-06-21 NOTE — HEALTH RISK ASSESSMENT
[No] : In the past 12 months have you used drugs other than those required for medical reasons? No [No falls in past year] : Patient reported no falls in the past year [0] : 2) Feeling down, depressed, or hopeless: Not at all (0) [[id number]] : 0 [Former] : Former [Change in mental status noted] : No change in mental status noted [Language] : denies difficulty with language [Behavior] : denies difficulty with behavior [Learning/Retaining New Information] : difficulty learning/retaining new information [Handling Complex Tasks] : denies difficulty handling complex tasks [Reasoning] : denies difficulty with reasoning [Spatial Ability and Orientation] : denies difficulty with spatial ability and orientation [None] : None [Alone] : lives alone [Retired] : retired [] :  [# Of Children ___] : has [unfilled] children [Feels Safe at Home] : Feels safe at home [Fully functional (bathing, dressing, toileting, transferring, walking, feeding)] : Fully functional (bathing, dressing, toileting, transferring, walking, feeding) [Fully functional (using the telephone, shopping, preparing meals, housekeeping, doing laundry, using] : Fully functional and needs no help or supervision to perform IADLs (using the telephone, shopping, preparing meals, housekeeping, doing laundry, using transportation, managing medications and managing finances) [Reports changes in hearing] : Reports no changes in hearing [Reports changes in vision] : Reports no changes in vision [Reports changes in dental health] : Reports changes in dental health [Smoke Detector] : smoke detector [Carbon Monoxide Detector] : carbon monoxide detector [Seat Belt] :  uses seat belt [MammogramComments] : BIRADS 2 [MammogramDate] : 03/23 [BoneDensityDate] : 8/21 [BoneDensityComments] : Osteoporosis [ColonoscopyDate] : 01/16 [ColonoscopyComments] : Internal hemorrhoids [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

## 2024-06-21 NOTE — REVIEW OF SYSTEMS
[Chest Pain] : no chest pain [Palpitations] : no palpitations [Lower Ext Edema] : no lower extremity edema [Dyspnea on Exertion] : no dyspnea on exertion [Joint Pain] : joint pain [Negative] : Heme/Lymph

## 2024-06-21 NOTE — HISTORY OF PRESENT ILLNESS
[FreeTextEntry1] : Wellness Visit [de-identified] : 79 year old female with a history of FBD, numerous concussions / head injuries, HLD, HTN, DM, osteoporosis who presents today for a wellness visit.   Has not had medical care since 11/2022.  No acute complaints.   Lost alot of weight last visit due to having extensive dental work.  Dentures do not fit.  Needs to find new dentist.  Eating soft foods.

## 2024-06-21 NOTE — PHYSICAL EXAM
[No Acute Distress] : no acute distress [Normal Sclera/Conjunctiva] : normal sclera/conjunctiva [EOMI] : extraocular movements intact [Normal Outer Ear/Nose] : the outer ears and nose were normal in appearance [Normal Oropharynx] : the oropharynx was normal [Normal TMs] : both tympanic membranes were normal [No Lymphadenopathy] : no lymphadenopathy [Supple] : supple [No Respiratory Distress] : no respiratory distress  [No Accessory Muscle Use] : no accessory muscle use [Clear to Auscultation] : lungs were clear to auscultation bilaterally [Normal Rate] : normal rate  [Regular Rhythm] : with a regular rhythm [Normal S1, S2] : normal S1 and S2 [No Carotid Bruits] : no carotid bruits [No Abdominal Bruit] : a ~M bruit was not heard ~T in the abdomen [No Edema] : there was no peripheral edema [Soft] : abdomen soft [Non Tender] : non-tender [Non-distended] : non-distended [Normal Bowel Sounds] : normal bowel sounds [Normal Supraclavicular Nodes] : no supraclavicular lymphadenopathy [Normal Posterior Cervical Nodes] : no posterior cervical lymphadenopathy [Normal Anterior Cervical Nodes] : no anterior cervical lymphadenopathy [Grossly Normal Strength/Tone] : grossly normal strength/tone [No Focal Deficits] : no focal deficits [Speech Grossly Normal] : speech grossly normal [Alert and Oriented x3] : oriented to person, place, and time [de-identified] : Edentulous lower  [de-identified] : James

## 2024-06-25 LAB
25(OH)D3 SERPL-MCNC: 75.4 NG/ML
ALBUMIN SERPL ELPH-MCNC: 4.8 G/DL
ALP BLD-CCNC: 107 U/L
ALT SERPL-CCNC: 25 U/L
ANION GAP SERPL CALC-SCNC: 14 MMOL/L
APPEARANCE: CLEAR
AST SERPL-CCNC: 24 U/L
BASOPHILS # BLD AUTO: 0.04 K/UL
BASOPHILS NFR BLD AUTO: 0.5 %
BILIRUB SERPL-MCNC: 0.8 MG/DL
BILIRUBIN URINE: NEGATIVE
BLOOD URINE: NEGATIVE
BUN SERPL-MCNC: 29 MG/DL
CALCIUM SERPL-MCNC: 10.1 MG/DL
CALCIUM SERPL-MCNC: 10.1 MG/DL
CHLORIDE SERPL-SCNC: 98 MMOL/L
CHOLEST SERPL-MCNC: 253 MG/DL
CO2 SERPL-SCNC: 26 MMOL/L
COLOR: YELLOW
CREAT SERPL-MCNC: 1.25 MG/DL
CREAT SPEC-SCNC: 60 MG/DL
CRP SERPL-MCNC: 23 MG/L
EGFR: 44 ML/MIN/1.73M2
EOSINOPHIL # BLD AUTO: 0.11 K/UL
EOSINOPHIL NFR BLD AUTO: 1.3 %
ESTIMATED AVERAGE GLUCOSE: 111 MG/DL
GLUCOSE QUALITATIVE U: NEGATIVE MG/DL
GLUCOSE SERPL-MCNC: 85 MG/DL
HBA1C MFR BLD HPLC: 5.5 %
HCT VFR BLD CALC: 39.5 %
HDLC SERPL-MCNC: 82 MG/DL
HGB BLD-MCNC: 12.1 G/DL
IMM GRANULOCYTES NFR BLD AUTO: 0.4 %
KETONES URINE: NEGATIVE MG/DL
LDLC SERPL CALC-MCNC: 157 MG/DL
LEUKOCYTE ESTERASE URINE: NEGATIVE
LYMPHOCYTES # BLD AUTO: 1.55 K/UL
LYMPHOCYTES NFR BLD AUTO: 18.9 %
MAN DIFF?: NORMAL
MCHC RBC-ENTMCNC: 28.5 PG
MCHC RBC-ENTMCNC: 30.6 GM/DL
MCV RBC AUTO: 92.9 FL
MICROALBUMIN 24H UR DL<=1MG/L-MCNC: <1.2 MG/DL
MICROALBUMIN/CREAT 24H UR-RTO: NORMAL MG/G
MONOCYTES # BLD AUTO: 0.86 K/UL
MONOCYTES NFR BLD AUTO: 10.5 %
NEUTROPHILS # BLD AUTO: 5.6 K/UL
NEUTROPHILS NFR BLD AUTO: 68.4 %
NITRITE URINE: NEGATIVE
NONHDLC SERPL-MCNC: 171 MG/DL
PARATHYROID HORMONE INTACT: 20 PG/ML
PH URINE: 6
PLATELET # BLD AUTO: 414 K/UL
POTASSIUM SERPL-SCNC: 4.6 MMOL/L
PROT SERPL-MCNC: 7.9 G/DL
PROTEIN URINE: NEGATIVE MG/DL
RBC # BLD: 4.25 M/UL
RBC # FLD: 13 %
SODIUM SERPL-SCNC: 139 MMOL/L
SPECIFIC GRAVITY URINE: 1.01
TRIGL SERPL-MCNC: 85 MG/DL
TSH SERPL-ACNC: 1.16 UIU/ML
UROBILINOGEN URINE: 0.2 MG/DL
VIT B12 SERPL-MCNC: 929 PG/ML
WBC # FLD AUTO: 8.19 K/UL

## 2024-06-25 RX ORDER — PRAVASTATIN SODIUM 40 MG/1
40 TABLET ORAL DAILY
Qty: 90 | Refills: 3 | Status: COMPLETED | COMMUNITY
Start: 2019-11-22 | End: 2024-06-25

## 2024-06-25 RX ORDER — ROSUVASTATIN CALCIUM 10 MG/1
10 TABLET, FILM COATED ORAL
Qty: 90 | Refills: 3 | Status: ACTIVE | COMMUNITY
Start: 2024-06-25 | End: 1900-01-01

## 2024-09-03 ENCOUNTER — APPOINTMENT (OUTPATIENT)
Dept: GERIATRICS | Facility: CLINIC | Age: 79
End: 2024-09-03

## 2024-10-09 ENCOUNTER — APPOINTMENT (OUTPATIENT)
Dept: GERIATRICS | Facility: CLINIC | Age: 79
End: 2024-10-09
Payer: MEDICARE

## 2024-10-09 VITALS
TEMPERATURE: 97.5 F | DIASTOLIC BLOOD PRESSURE: 90 MMHG | HEART RATE: 64 BPM | HEIGHT: 63 IN | OXYGEN SATURATION: 98 % | WEIGHT: 147 LBS | SYSTOLIC BLOOD PRESSURE: 148 MMHG | BODY MASS INDEX: 26.05 KG/M2

## 2024-10-09 DIAGNOSIS — E78.5 HYPERLIPIDEMIA, UNSPECIFIED: ICD-10-CM

## 2024-10-09 DIAGNOSIS — M25.552 PAIN IN LEFT HIP: ICD-10-CM

## 2024-10-09 DIAGNOSIS — I48.91 UNSPECIFIED ATRIAL FIBRILLATION: ICD-10-CM

## 2024-10-09 DIAGNOSIS — G89.29 PAIN IN LEFT HIP: ICD-10-CM

## 2024-10-09 DIAGNOSIS — E11.8 TYPE 2 DIABETES MELLITUS WITH UNSPECIFIED COMPLICATIONS: ICD-10-CM

## 2024-10-09 PROCEDURE — 93000 ELECTROCARDIOGRAM COMPLETE: CPT

## 2024-10-09 PROCEDURE — 99215 OFFICE O/P EST HI 40 MIN: CPT

## 2024-10-09 PROCEDURE — 36415 COLL VENOUS BLD VENIPUNCTURE: CPT

## 2024-10-09 PROCEDURE — G2211 COMPLEX E/M VISIT ADD ON: CPT

## 2024-10-09 RX ORDER — METOPROLOL SUCCINATE 25 MG/1
25 TABLET, EXTENDED RELEASE ORAL DAILY
Qty: 1 | Refills: 1 | Status: ACTIVE | COMMUNITY
Start: 2024-10-09 | End: 1900-01-01

## 2024-10-09 RX ORDER — APIXABAN 5 MG/1
5 TABLET, FILM COATED ORAL
Qty: 60 | Refills: 0 | Status: ACTIVE | COMMUNITY
Start: 2024-10-09 | End: 1900-01-01

## 2024-10-10 LAB
CHOLEST SERPL-MCNC: 270 MG/DL
ESTIMATED AVERAGE GLUCOSE: 108 MG/DL
HBA1C MFR BLD HPLC: 5.4 %
HDLC SERPL-MCNC: 96 MG/DL
LDLC SERPL CALC-MCNC: 163 MG/DL
NONHDLC SERPL-MCNC: 174 MG/DL
TRIGL SERPL-MCNC: 70 MG/DL

## 2025-02-19 ENCOUNTER — APPOINTMENT (OUTPATIENT)
Dept: GERIATRICS | Facility: CLINIC | Age: 80
End: 2025-02-19
Payer: MEDICARE

## 2025-02-19 VITALS
TEMPERATURE: 98.1 F | HEART RATE: 72 BPM | DIASTOLIC BLOOD PRESSURE: 70 MMHG | OXYGEN SATURATION: 98 % | HEIGHT: 63 IN | SYSTOLIC BLOOD PRESSURE: 142 MMHG | WEIGHT: 151 LBS | BODY MASS INDEX: 26.75 KG/M2

## 2025-02-19 DIAGNOSIS — E78.5 HYPERLIPIDEMIA, UNSPECIFIED: ICD-10-CM

## 2025-02-19 DIAGNOSIS — R21 RASH AND OTHER NONSPECIFIC SKIN ERUPTION: ICD-10-CM

## 2025-02-19 DIAGNOSIS — Z23 ENCOUNTER FOR IMMUNIZATION: ICD-10-CM

## 2025-02-19 PROCEDURE — G0009: CPT

## 2025-02-19 PROCEDURE — 99213 OFFICE O/P EST LOW 20 MIN: CPT

## 2025-02-19 PROCEDURE — 90677 PCV20 VACCINE IM: CPT

## 2025-02-19 PROCEDURE — G2211 COMPLEX E/M VISIT ADD ON: CPT

## 2025-02-19 RX ORDER — HYDROCORTISONE 25 MG/G
2.5 CREAM TOPICAL TWICE DAILY
Qty: 1 | Refills: 1 | Status: ACTIVE | COMMUNITY
Start: 2025-02-19 | End: 1900-01-01

## 2025-05-14 ENCOUNTER — APPOINTMENT (OUTPATIENT)
Dept: PAIN MANAGEMENT | Facility: CLINIC | Age: 80
End: 2025-05-14
Payer: MEDICARE

## 2025-05-14 ENCOUNTER — RESULT REVIEW (OUTPATIENT)
Age: 80
End: 2025-05-14

## 2025-05-14 VITALS
HEIGHT: 63 IN | BODY MASS INDEX: 26.75 KG/M2 | WEIGHT: 151 LBS | DIASTOLIC BLOOD PRESSURE: 82 MMHG | SYSTOLIC BLOOD PRESSURE: 154 MMHG

## 2025-05-14 DIAGNOSIS — M54.16 RADICULOPATHY, LUMBAR REGION: ICD-10-CM

## 2025-05-14 DIAGNOSIS — M47.817 SPONDYLOSIS W/OUT MYELOPATHY OR RADICULOPATHY, LUMBOSACRAL REGION: ICD-10-CM

## 2025-05-14 DIAGNOSIS — M51.360: ICD-10-CM

## 2025-05-14 DIAGNOSIS — M53.3 SACROCOCCYGEAL DISORDERS, NOT ELSEWHERE CLASSIFIED: ICD-10-CM

## 2025-05-14 DIAGNOSIS — M79.2 NEURALGIA AND NEURITIS, UNSPECIFIED: ICD-10-CM

## 2025-05-14 DIAGNOSIS — M79.18 MYALGIA, OTHER SITE: ICD-10-CM

## 2025-05-14 DIAGNOSIS — M48.062 SPINAL STENOSIS, LUMBAR REGION WITH NEUROGENIC CLAUDICATION: ICD-10-CM

## 2025-05-14 DIAGNOSIS — M70.62 TROCHANTERIC BURSITIS, LEFT HIP: ICD-10-CM

## 2025-05-14 PROCEDURE — G2211 COMPLEX E/M VISIT ADD ON: CPT

## 2025-05-14 PROCEDURE — 99204 OFFICE O/P NEW MOD 45 MIN: CPT

## 2025-05-14 RX ORDER — PRAVASTATIN SODIUM 80 MG/1
TABLET ORAL
Refills: 0 | Status: ACTIVE | COMMUNITY

## 2025-05-20 ENCOUNTER — APPOINTMENT (OUTPATIENT)
Dept: PAIN MANAGEMENT | Facility: CLINIC | Age: 80
End: 2025-05-20

## 2025-05-21 ENCOUNTER — APPOINTMENT (OUTPATIENT)
Dept: GERIATRICS | Facility: CLINIC | Age: 80
End: 2025-05-21
Payer: MEDICARE

## 2025-05-21 PROBLEM — M25.551 ACUTE PAIN OF RIGHT HIP: Status: ACTIVE | Noted: 2025-05-14

## 2025-05-21 PROCEDURE — 99213 OFFICE O/P EST LOW 20 MIN: CPT | Mod: 93

## 2025-05-28 ENCOUNTER — APPOINTMENT (OUTPATIENT)
Dept: GERIATRICS | Facility: CLINIC | Age: 80
End: 2025-05-28
Payer: MEDICARE

## 2025-05-28 VITALS
DIASTOLIC BLOOD PRESSURE: 70 MMHG | OXYGEN SATURATION: 97 % | SYSTOLIC BLOOD PRESSURE: 141 MMHG | BODY MASS INDEX: 27.29 KG/M2 | TEMPERATURE: 98.6 F | HEIGHT: 63 IN | WEIGHT: 154 LBS | HEART RATE: 69 BPM

## 2025-05-28 DIAGNOSIS — I10 ESSENTIAL (PRIMARY) HYPERTENSION: ICD-10-CM

## 2025-05-28 DIAGNOSIS — R41.3 OTHER AMNESIA: ICD-10-CM

## 2025-05-28 DIAGNOSIS — I48.91 UNSPECIFIED ATRIAL FIBRILLATION: ICD-10-CM

## 2025-05-28 DIAGNOSIS — R21 RASH AND OTHER NONSPECIFIC SKIN ERUPTION: ICD-10-CM

## 2025-05-28 DIAGNOSIS — E78.5 HYPERLIPIDEMIA, UNSPECIFIED: ICD-10-CM

## 2025-05-28 DIAGNOSIS — E55.9 VITAMIN D DEFICIENCY, UNSPECIFIED: ICD-10-CM

## 2025-05-28 DIAGNOSIS — M25.551 PAIN IN RIGHT HIP: ICD-10-CM

## 2025-05-28 DIAGNOSIS — E11.8 TYPE 2 DIABETES MELLITUS WITH UNSPECIFIED COMPLICATIONS: ICD-10-CM

## 2025-05-28 PROCEDURE — 36415 COLL VENOUS BLD VENIPUNCTURE: CPT

## 2025-05-28 PROCEDURE — G2211 COMPLEX E/M VISIT ADD ON: CPT

## 2025-05-28 PROCEDURE — 99214 OFFICE O/P EST MOD 30 MIN: CPT

## 2025-05-30 LAB
25(OH)D3 SERPL-MCNC: 56.3 NG/ML
ALBUMIN SERPL ELPH-MCNC: 4.1 G/DL
ALP BLD-CCNC: 108 U/L
ALT SERPL-CCNC: 15 U/L
ANION GAP SERPL CALC-SCNC: 13 MMOL/L
APPEARANCE: CLEAR
AST SERPL-CCNC: 23 U/L
BASOPHILS # BLD AUTO: 0.07 K/UL
BASOPHILS NFR BLD AUTO: 1.7 %
BILIRUB SERPL-MCNC: 0.7 MG/DL
BILIRUBIN URINE: NEGATIVE
BLOOD URINE: NEGATIVE
BUN SERPL-MCNC: 22 MG/DL
CALCIUM SERPL-MCNC: 10.2 MG/DL
CHLORIDE SERPL-SCNC: 102 MMOL/L
CHOLEST SERPL-MCNC: 186 MG/DL
CO2 SERPL-SCNC: 26 MMOL/L
COLOR: YELLOW
CREAT SERPL-MCNC: 1.39 MG/DL
CRP SERPL-MCNC: <3 MG/L
EGFRCR SERPLBLD CKD-EPI 2021: 39 ML/MIN/1.73M2
EOSINOPHIL # BLD AUTO: 0.21 K/UL
EOSINOPHIL NFR BLD AUTO: 5 %
ESTIMATED AVERAGE GLUCOSE: 114 MG/DL
GLUCOSE QUALITATIVE U: NEGATIVE MG/DL
GLUCOSE SERPL-MCNC: 93 MG/DL
HBA1C MFR BLD HPLC: 5.6 %
HCT VFR BLD CALC: 37.1 %
HDLC SERPL-MCNC: 67 MG/DL
HGB BLD-MCNC: 11.9 G/DL
IMM GRANULOCYTES NFR BLD AUTO: 0.5 %
KETONES URINE: NEGATIVE MG/DL
LDLC SERPL-MCNC: 104 MG/DL
LEUKOCYTE ESTERASE URINE: NEGATIVE
LYMPHOCYTES # BLD AUTO: 1.06 K/UL
LYMPHOCYTES NFR BLD AUTO: 25.1 %
MAN DIFF?: NORMAL
MCHC RBC-ENTMCNC: 29.2 PG
MCHC RBC-ENTMCNC: 32.1 G/DL
MCV RBC AUTO: 90.9 FL
MONOCYTES # BLD AUTO: 0.48 K/UL
MONOCYTES NFR BLD AUTO: 11.4 %
NEUTROPHILS # BLD AUTO: 2.38 K/UL
NEUTROPHILS NFR BLD AUTO: 56.3 %
NITRITE URINE: NEGATIVE
NONHDLC SERPL-MCNC: 119 MG/DL
PH URINE: 7
PLATELET # BLD AUTO: 343 K/UL
POTASSIUM SERPL-SCNC: 5.1 MMOL/L
PROT SERPL-MCNC: 7 G/DL
PROTEIN URINE: NEGATIVE MG/DL
RBC # BLD: 4.08 M/UL
RBC # FLD: 13.1 %
SODIUM SERPL-SCNC: 141 MMOL/L
SPECIFIC GRAVITY URINE: 1.01
TRIGL SERPL-MCNC: 79 MG/DL
TSH SERPL-ACNC: 0.82 UIU/ML
UROBILINOGEN URINE: 0.2 MG/DL
VIT B12 SERPL-MCNC: 801 PG/ML
WBC # FLD AUTO: 4.22 K/UL

## 2025-06-04 RX ORDER — ROSUVASTATIN CALCIUM 5 MG/1
5 TABLET, FILM COATED ORAL
Qty: 90 | Refills: 1 | Status: ACTIVE | COMMUNITY
Start: 2025-06-04 | End: 1900-01-01

## 2025-06-04 RX ORDER — TELMISARTAN 20 MG/1
20 TABLET ORAL DAILY
Qty: 90 | Refills: 1 | Status: ACTIVE | COMMUNITY
Start: 2025-06-04 | End: 1900-01-01

## 2025-07-02 ENCOUNTER — APPOINTMENT (OUTPATIENT)
Dept: GERIATRICS | Facility: CLINIC | Age: 80
End: 2025-07-02